# Patient Record
Sex: FEMALE | Race: BLACK OR AFRICAN AMERICAN | NOT HISPANIC OR LATINO | Employment: FULL TIME | URBAN - METROPOLITAN AREA
[De-identification: names, ages, dates, MRNs, and addresses within clinical notes are randomized per-mention and may not be internally consistent; named-entity substitution may affect disease eponyms.]

---

## 2021-06-10 DIAGNOSIS — M25.569 KNEE PAIN: Primary | ICD-10-CM

## 2021-06-10 DIAGNOSIS — M25.579 ANKLE PAIN: Primary | ICD-10-CM

## 2021-06-24 ENCOUNTER — HOSPITAL ENCOUNTER (OUTPATIENT)
Dept: RADIOLOGY | Facility: HOSPITAL | Age: 51
Discharge: HOME | End: 2021-06-24
Attending: FAMILY MEDICINE
Payer: COMMERCIAL

## 2021-06-24 DIAGNOSIS — M25.569 KNEE PAIN: ICD-10-CM

## 2021-06-24 DIAGNOSIS — M25.579 ANKLE PAIN: ICD-10-CM

## 2021-06-24 PROCEDURE — 73610 XR ANKLE COMPLETE 3 VIEW LEFT: ICD-10-PCS | Mod: 26,LT,, | Performed by: RADIOLOGY

## 2021-06-24 PROCEDURE — 73610 X-RAY EXAM OF ANKLE: CPT | Mod: TC,LT

## 2021-06-24 PROCEDURE — 73562 X-RAY EXAM OF KNEE 3: CPT | Mod: TC,LT

## 2021-06-24 PROCEDURE — 73562 XR KNEE 3 VIEW LEFT: ICD-10-PCS | Mod: 26,LT,, | Performed by: RADIOLOGY

## 2021-06-24 PROCEDURE — 73610 X-RAY EXAM OF ANKLE: CPT | Mod: 26,LT,, | Performed by: RADIOLOGY

## 2021-06-24 PROCEDURE — 73562 X-RAY EXAM OF KNEE 3: CPT | Mod: 26,LT,, | Performed by: RADIOLOGY

## 2021-07-27 ENCOUNTER — OFFICE VISIT (OUTPATIENT)
Dept: ORTHOPEDICS | Facility: CLINIC | Age: 51
End: 2021-07-27
Payer: COMMERCIAL

## 2021-07-27 VITALS — WEIGHT: 244 LBS | HEIGHT: 68 IN | BODY MASS INDEX: 36.98 KG/M2

## 2021-07-27 DIAGNOSIS — M17.12 PRIMARY OSTEOARTHRITIS OF LEFT KNEE: Primary | ICD-10-CM

## 2021-07-27 PROCEDURE — 1160F RVW MEDS BY RX/DR IN RCRD: CPT | Mod: CPTII,S$GLB,, | Performed by: ORTHOPAEDIC SURGERY

## 2021-07-27 PROCEDURE — 20610 LARGE JOINT ASPIRATION/INJECTION: L KNEE: ICD-10-PCS | Mod: LT,S$GLB,, | Performed by: ORTHOPAEDIC SURGERY

## 2021-07-27 PROCEDURE — 99999 PR PBB SHADOW E&M-EST. PATIENT-LVL III: ICD-10-PCS | Mod: PBBFAC,,, | Performed by: ORTHOPAEDIC SURGERY

## 2021-07-27 PROCEDURE — 99999 PR PBB SHADOW E&M-EST. PATIENT-LVL III: CPT | Mod: PBBFAC,,, | Performed by: ORTHOPAEDIC SURGERY

## 2021-07-27 PROCEDURE — 3008F BODY MASS INDEX DOCD: CPT | Mod: CPTII,S$GLB,, | Performed by: ORTHOPAEDIC SURGERY

## 2021-07-27 PROCEDURE — 1159F PR MEDICATION LIST DOCUMENTED IN MEDICAL RECORD: ICD-10-PCS | Mod: CPTII,S$GLB,, | Performed by: ORTHOPAEDIC SURGERY

## 2021-07-27 PROCEDURE — 1125F AMNT PAIN NOTED PAIN PRSNT: CPT | Mod: CPTII,S$GLB,, | Performed by: ORTHOPAEDIC SURGERY

## 2021-07-27 PROCEDURE — 99203 PR OFFICE/OUTPT VISIT, NEW, LEVL III, 30-44 MIN: ICD-10-PCS | Mod: 25,S$GLB,, | Performed by: ORTHOPAEDIC SURGERY

## 2021-07-27 PROCEDURE — 1125F PR PAIN SEVERITY QUANTIFIED, PAIN PRESENT: ICD-10-PCS | Mod: CPTII,S$GLB,, | Performed by: ORTHOPAEDIC SURGERY

## 2021-07-27 PROCEDURE — 1159F MED LIST DOCD IN RCRD: CPT | Mod: CPTII,S$GLB,, | Performed by: ORTHOPAEDIC SURGERY

## 2021-07-27 PROCEDURE — 20610 DRAIN/INJ JOINT/BURSA W/O US: CPT | Mod: LT,S$GLB,, | Performed by: ORTHOPAEDIC SURGERY

## 2021-07-27 PROCEDURE — 99203 OFFICE O/P NEW LOW 30 MIN: CPT | Mod: 25,S$GLB,, | Performed by: ORTHOPAEDIC SURGERY

## 2021-07-27 PROCEDURE — 1160F PR REVIEW ALL MEDS BY PRESCRIBER/CLIN PHARMACIST DOCUMENTED: ICD-10-PCS | Mod: CPTII,S$GLB,, | Performed by: ORTHOPAEDIC SURGERY

## 2021-07-27 PROCEDURE — 3008F PR BODY MASS INDEX (BMI) DOCUMENTED: ICD-10-PCS | Mod: CPTII,S$GLB,, | Performed by: ORTHOPAEDIC SURGERY

## 2021-07-27 RX ORDER — TRIAMCINOLONE ACETONIDE 40 MG/ML
40 INJECTION, SUSPENSION INTRA-ARTICULAR; INTRAMUSCULAR
Status: DISCONTINUED | OUTPATIENT
Start: 2021-07-27 | End: 2021-07-27 | Stop reason: HOSPADM

## 2021-07-27 RX ORDER — NAPROXEN 500 MG/1
TABLET ORAL
COMMUNITY
Start: 2021-06-10 | End: 2021-10-26

## 2021-07-27 RX ORDER — LOSARTAN POTASSIUM 50 MG/1
50 TABLET ORAL DAILY
COMMUNITY
Start: 2021-06-10 | End: 2022-04-18 | Stop reason: DRUGHIGH

## 2021-07-27 RX ORDER — TRAZODONE HYDROCHLORIDE 100 MG/1
100 TABLET ORAL NIGHTLY PRN
COMMUNITY
Start: 2021-06-10 | End: 2021-10-26

## 2021-07-27 RX ORDER — PANTOPRAZOLE SODIUM 40 MG/1
40 TABLET, DELAYED RELEASE ORAL DAILY
COMMUNITY
Start: 2021-06-21

## 2021-07-27 RX ORDER — ATORVASTATIN CALCIUM 10 MG/1
10 TABLET, FILM COATED ORAL DAILY
COMMUNITY
Start: 2021-06-10 | End: 2021-10-26

## 2021-07-27 RX ADMIN — TRIAMCINOLONE ACETONIDE 40 MG: 40 INJECTION, SUSPENSION INTRA-ARTICULAR; INTRAMUSCULAR at 10:07

## 2021-10-26 ENCOUNTER — OFFICE VISIT (OUTPATIENT)
Dept: ORTHOPEDICS | Facility: CLINIC | Age: 51
End: 2021-10-26
Payer: COMMERCIAL

## 2021-10-26 VITALS — HEIGHT: 68 IN | WEIGHT: 244.06 LBS | BODY MASS INDEX: 36.99 KG/M2

## 2021-10-26 DIAGNOSIS — M17.12 PRIMARY OSTEOARTHRITIS OF LEFT KNEE: Primary | ICD-10-CM

## 2021-10-26 DIAGNOSIS — R20.0 NUMBNESS OF LEFT FOOT: ICD-10-CM

## 2021-10-26 PROCEDURE — 1160F PR REVIEW ALL MEDS BY PRESCRIBER/CLIN PHARMACIST DOCUMENTED: ICD-10-PCS | Mod: CPTII,S$GLB,, | Performed by: ORTHOPAEDIC SURGERY

## 2021-10-26 PROCEDURE — 99213 PR OFFICE/OUTPT VISIT, EST, LEVL III, 20-29 MIN: ICD-10-PCS | Mod: S$GLB,,, | Performed by: ORTHOPAEDIC SURGERY

## 2021-10-26 PROCEDURE — 99999 PR PBB SHADOW E&M-EST. PATIENT-LVL III: CPT | Mod: PBBFAC,,, | Performed by: ORTHOPAEDIC SURGERY

## 2021-10-26 PROCEDURE — 4010F ACE/ARB THERAPY RXD/TAKEN: CPT | Mod: CPTII,S$GLB,, | Performed by: ORTHOPAEDIC SURGERY

## 2021-10-26 PROCEDURE — 3008F PR BODY MASS INDEX (BMI) DOCUMENTED: ICD-10-PCS | Mod: CPTII,S$GLB,, | Performed by: ORTHOPAEDIC SURGERY

## 2021-10-26 PROCEDURE — 99999 PR PBB SHADOW E&M-EST. PATIENT-LVL III: ICD-10-PCS | Mod: PBBFAC,,, | Performed by: ORTHOPAEDIC SURGERY

## 2021-10-26 PROCEDURE — 1159F PR MEDICATION LIST DOCUMENTED IN MEDICAL RECORD: ICD-10-PCS | Mod: CPTII,S$GLB,, | Performed by: ORTHOPAEDIC SURGERY

## 2021-10-26 PROCEDURE — 4010F PR ACE/ARB THEARPY RXD/TAKEN: ICD-10-PCS | Mod: CPTII,S$GLB,, | Performed by: ORTHOPAEDIC SURGERY

## 2021-10-26 PROCEDURE — 1159F MED LIST DOCD IN RCRD: CPT | Mod: CPTII,S$GLB,, | Performed by: ORTHOPAEDIC SURGERY

## 2021-10-26 PROCEDURE — 99213 OFFICE O/P EST LOW 20 MIN: CPT | Mod: S$GLB,,, | Performed by: ORTHOPAEDIC SURGERY

## 2021-10-26 PROCEDURE — 3008F BODY MASS INDEX DOCD: CPT | Mod: CPTII,S$GLB,, | Performed by: ORTHOPAEDIC SURGERY

## 2021-10-26 PROCEDURE — 1160F RVW MEDS BY RX/DR IN RCRD: CPT | Mod: CPTII,S$GLB,, | Performed by: ORTHOPAEDIC SURGERY

## 2021-10-26 RX ORDER — LISINOPRIL 20 MG/1
20 TABLET ORAL DAILY
COMMUNITY
End: 2022-07-06 | Stop reason: ALTCHOICE

## 2021-10-26 RX ORDER — MELOXICAM 15 MG/1
15 TABLET ORAL DAILY
Qty: 30 TABLET | Refills: 2 | Status: SHIPPED | OUTPATIENT
Start: 2021-10-26 | End: 2022-02-01 | Stop reason: SDUPTHER

## 2021-10-26 RX ORDER — HYDROCHLOROTHIAZIDE 12.5 MG/1
12.5 CAPSULE ORAL DAILY
COMMUNITY

## 2022-02-01 ENCOUNTER — OFFICE VISIT (OUTPATIENT)
Dept: ORTHOPEDICS | Facility: CLINIC | Age: 52
End: 2022-02-01
Payer: MEDICAID

## 2022-02-01 VITALS — WEIGHT: 244.06 LBS | BODY MASS INDEX: 36.99 KG/M2 | HEIGHT: 68 IN

## 2022-02-01 DIAGNOSIS — M17.12 PRIMARY OSTEOARTHRITIS OF LEFT KNEE: Primary | ICD-10-CM

## 2022-02-01 PROCEDURE — 99213 OFFICE O/P EST LOW 20 MIN: CPT | Mod: PBBFAC,PN | Performed by: ORTHOPAEDIC SURGERY

## 2022-02-01 PROCEDURE — 3008F PR BODY MASS INDEX (BMI) DOCUMENTED: ICD-10-PCS | Mod: CPTII,S$GLB,, | Performed by: ORTHOPAEDIC SURGERY

## 2022-02-01 PROCEDURE — 1159F MED LIST DOCD IN RCRD: CPT | Mod: CPTII,S$GLB,, | Performed by: ORTHOPAEDIC SURGERY

## 2022-02-01 PROCEDURE — 1160F RVW MEDS BY RX/DR IN RCRD: CPT | Mod: CPTII,S$GLB,, | Performed by: ORTHOPAEDIC SURGERY

## 2022-02-01 PROCEDURE — 3008F BODY MASS INDEX DOCD: CPT | Mod: CPTII,S$GLB,, | Performed by: ORTHOPAEDIC SURGERY

## 2022-02-01 PROCEDURE — 1159F PR MEDICATION LIST DOCUMENTED IN MEDICAL RECORD: ICD-10-PCS | Mod: CPTII,S$GLB,, | Performed by: ORTHOPAEDIC SURGERY

## 2022-02-01 PROCEDURE — 99213 OFFICE O/P EST LOW 20 MIN: CPT | Mod: S$GLB,,, | Performed by: ORTHOPAEDIC SURGERY

## 2022-02-01 PROCEDURE — 99999 PR PBB SHADOW E&M-EST. PATIENT-LVL III: CPT | Mod: PBBFAC,,, | Performed by: ORTHOPAEDIC SURGERY

## 2022-02-01 PROCEDURE — 99999 PR PBB SHADOW E&M-EST. PATIENT-LVL III: ICD-10-PCS | Mod: PBBFAC,,, | Performed by: ORTHOPAEDIC SURGERY

## 2022-02-01 PROCEDURE — 99213 PR OFFICE/OUTPT VISIT, EST, LEVL III, 20-29 MIN: ICD-10-PCS | Mod: S$GLB,,, | Performed by: ORTHOPAEDIC SURGERY

## 2022-02-01 PROCEDURE — 1160F PR REVIEW ALL MEDS BY PRESCRIBER/CLIN PHARMACIST DOCUMENTED: ICD-10-PCS | Mod: CPTII,S$GLB,, | Performed by: ORTHOPAEDIC SURGERY

## 2022-02-01 RX ORDER — MELOXICAM 15 MG/1
15 TABLET ORAL DAILY
Qty: 30 TABLET | Refills: 5 | Status: SHIPPED | OUTPATIENT
Start: 2022-02-01

## 2022-02-01 NOTE — PROGRESS NOTES
Subjective:      Patient ID: Jerald Morales is a 51 y.o. female.    Chief Complaint: Knee Pain (Left)    HPI    Follow-up for osteoarthritis.  The patient reports meloxicam was very helpful.  Her to prescription ran out and she notes increasing medial pain radiating down the medial leg.  She denies swelling or mechanical symptoms.        Review of Systems   Constitutional: Negative for fever and weight loss.   HENT: Negative for congestion.    Eyes: Negative for visual disturbance.   Cardiovascular: Negative for chest pain.   Respiratory: Negative for shortness of breath.    Hematologic/Lymphatic: Negative for bleeding problem. Does not bruise/bleed easily.   Skin: Negative for poor wound healing.   Musculoskeletal: Positive for joint pain.   Gastrointestinal: Negative for abdominal pain.   Genitourinary: Negative for dysuria.   Neurological: Negative for seizures.   Psychiatric/Behavioral: Negative for altered mental status.   Allergic/Immunologic: Negative for persistent infections.         Objective:      Ortho/SPM Exam      Left knee     The patient is not in acute distress.   Body habitus is normal.   Sclera appear normal  No respiratory distress  The patient walks without a limp.  Resisted SLR negative.   The skin over the knee is intact.  Knee effusion trace  Tendernes is located absent.  Range of motion- Flexion full, Extension full.   Ligament exam:              MCL 1+              Lachman intact              Post sag intact              LCL intact  Patellar apprehension negative.  Popliteal cyst negative  Patellar crepitation absent.  Flexion/pinch negative.  Pulses DP present, PT present.  Motor normal 5/5 strength in all tested muscle groups.   Sensory normal.              Assessment:       No diagnosis found.     The condition is structurally mild.  Considering the good response to oral medication I recommend refilling this for now.  If the condition progresses surgical intervention may be  considered.        Plan:       There are no diagnoses linked to this encounter.      I explained my diagnostic impression and the reasoning behind it in detail, using layman's terms.  Models and/or pictures were used to help in the explanation.    Refill meloxicam    I explained to the patient that I am providing a prescription for anti-inflammatory medication.  I warned the patient that it should not be taken with other anti-inflammatory medications including over-the-counter products containing ibuprofen and naproxen.  I advised them to consult their primary physician or pharmacist if there is any question about this in the future.  I discussed the possible side effects including cardiovascular issues, renal issues and gastrointestinal problems.  I advised the patient to discontinue the medication should they develop persistent symptoms of dyspepsia.    I also explained that should they elect to continue this medication long-term, that is beyond the prescription that I provide at this time, they should contact their primary physician for refills and appropriate monitoring.    I explained the potential role of surgery in the treatment of this condition to the patient.  They understand that if nonsurgical measures do not adequately control symptoms, surgery will be considered in the future.    X-ray next visit

## 2022-04-18 ENCOUNTER — OFFICE VISIT (OUTPATIENT)
Dept: GASTROENTEROLOGY | Facility: CLINIC | Age: 52
End: 2022-04-18
Payer: COMMERCIAL

## 2022-04-18 VITALS
SYSTOLIC BLOOD PRESSURE: 132 MMHG | HEART RATE: 65 BPM | WEIGHT: 270.38 LBS | DIASTOLIC BLOOD PRESSURE: 81 MMHG | HEIGHT: 68 IN | BODY MASS INDEX: 40.98 KG/M2

## 2022-04-18 DIAGNOSIS — E66.01 CLASS 3 SEVERE OBESITY DUE TO EXCESS CALORIES WITHOUT SERIOUS COMORBIDITY WITH BODY MASS INDEX (BMI) OF 40.0 TO 44.9 IN ADULT: ICD-10-CM

## 2022-04-18 DIAGNOSIS — K59.09 CHRONIC CONSTIPATION: Primary | ICD-10-CM

## 2022-04-18 DIAGNOSIS — K21.9 GASTROESOPHAGEAL REFLUX DISEASE, UNSPECIFIED WHETHER ESOPHAGITIS PRESENT: ICD-10-CM

## 2022-04-18 PROBLEM — E66.813 CLASS 3 SEVERE OBESITY DUE TO EXCESS CALORIES WITHOUT SERIOUS COMORBIDITY WITH BODY MASS INDEX (BMI) OF 40.0 TO 44.9 IN ADULT: Status: ACTIVE | Noted: 2022-04-18

## 2022-04-18 PROBLEM — I10 ESSENTIAL HYPERTENSION: Status: ACTIVE | Noted: 2022-04-18

## 2022-04-18 PROCEDURE — 3008F BODY MASS INDEX DOCD: CPT | Mod: CPTII,S$GLB,, | Performed by: NURSE PRACTITIONER

## 2022-04-18 PROCEDURE — 99203 OFFICE O/P NEW LOW 30 MIN: CPT | Mod: S$GLB,,, | Performed by: NURSE PRACTITIONER

## 2022-04-18 PROCEDURE — 4010F PR ACE/ARB THEARPY RXD/TAKEN: ICD-10-PCS | Mod: CPTII,S$GLB,, | Performed by: NURSE PRACTITIONER

## 2022-04-18 PROCEDURE — 4010F ACE/ARB THERAPY RXD/TAKEN: CPT | Mod: CPTII,S$GLB,, | Performed by: NURSE PRACTITIONER

## 2022-04-18 PROCEDURE — 99214 OFFICE O/P EST MOD 30 MIN: CPT | Mod: PBBFAC,PO | Performed by: NURSE PRACTITIONER

## 2022-04-18 PROCEDURE — 3079F PR MOST RECENT DIASTOLIC BLOOD PRESSURE 80-89 MM HG: ICD-10-PCS | Mod: CPTII,S$GLB,, | Performed by: NURSE PRACTITIONER

## 2022-04-18 PROCEDURE — 1159F PR MEDICATION LIST DOCUMENTED IN MEDICAL RECORD: ICD-10-PCS | Mod: CPTII,S$GLB,, | Performed by: NURSE PRACTITIONER

## 2022-04-18 PROCEDURE — 99203 PR OFFICE/OUTPT VISIT, NEW, LEVL III, 30-44 MIN: ICD-10-PCS | Mod: S$GLB,,, | Performed by: NURSE PRACTITIONER

## 2022-04-18 PROCEDURE — 3008F PR BODY MASS INDEX (BMI) DOCUMENTED: ICD-10-PCS | Mod: CPTII,S$GLB,, | Performed by: NURSE PRACTITIONER

## 2022-04-18 PROCEDURE — 1160F PR REVIEW ALL MEDS BY PRESCRIBER/CLIN PHARMACIST DOCUMENTED: ICD-10-PCS | Mod: CPTII,S$GLB,, | Performed by: NURSE PRACTITIONER

## 2022-04-18 PROCEDURE — 99999 PR PBB SHADOW E&M-EST. PATIENT-LVL IV: CPT | Mod: PBBFAC,,, | Performed by: NURSE PRACTITIONER

## 2022-04-18 PROCEDURE — 3075F SYST BP GE 130 - 139MM HG: CPT | Mod: CPTII,S$GLB,, | Performed by: NURSE PRACTITIONER

## 2022-04-18 PROCEDURE — 99999 PR PBB SHADOW E&M-EST. PATIENT-LVL IV: ICD-10-PCS | Mod: PBBFAC,,, | Performed by: NURSE PRACTITIONER

## 2022-04-18 PROCEDURE — 3075F PR MOST RECENT SYSTOLIC BLOOD PRESS GE 130-139MM HG: ICD-10-PCS | Mod: CPTII,S$GLB,, | Performed by: NURSE PRACTITIONER

## 2022-04-18 PROCEDURE — 1160F RVW MEDS BY RX/DR IN RCRD: CPT | Mod: CPTII,S$GLB,, | Performed by: NURSE PRACTITIONER

## 2022-04-18 PROCEDURE — 1159F MED LIST DOCD IN RCRD: CPT | Mod: CPTII,S$GLB,, | Performed by: NURSE PRACTITIONER

## 2022-04-18 PROCEDURE — 3079F DIAST BP 80-89 MM HG: CPT | Mod: CPTII,S$GLB,, | Performed by: NURSE PRACTITIONER

## 2022-04-18 RX ORDER — ATORVASTATIN CALCIUM 10 MG/1
10 TABLET, FILM COATED ORAL DAILY
COMMUNITY
Start: 2022-03-18

## 2022-04-18 RX ORDER — LOSARTAN POTASSIUM 100 MG/1
100 TABLET ORAL DAILY
COMMUNITY
Start: 2022-03-18

## 2022-04-18 RX ORDER — TRAZODONE HYDROCHLORIDE 100 MG/1
100 TABLET ORAL NIGHTLY
COMMUNITY
Start: 2022-03-18

## 2022-04-18 RX ORDER — LINACLOTIDE 145 UG/1
145 CAPSULE, GELATIN COATED ORAL EVERY MORNING
COMMUNITY
Start: 2021-12-06 | End: 2022-04-18

## 2022-04-18 NOTE — PATIENT INSTRUCTIONS
Purchase 3 bottles of magnesium citrate (can be purchased from any pharmacy or [facility]). Drink 1 bottle of magnesium citrate and repeat 2nd bottle in 2-3 hours to clean out your colon and repeat again for 3 bottle. Then start Linzess 72 mcg daily.    Follow up in 3-4 weeks.

## 2022-04-18 NOTE — PROGRESS NOTES
Subjective:       Patient ID: Jerald Morales is a 51 y.o. female.    Chief Complaint: Constipation, Gastroesophageal Reflux, and Nausea (After eating )    52 y/o female with hypertension referred by PCP for GERD and constipation. Patient reports chronic constipation for over 1 year. She is taking Linzess 145 mcg as needed. States Linzess causes severe diarrhea. Advised patient medication is must be taken daily for effectiveness. Also tried Miralax which caused significant bloating and was not effective. Takes pantoprazole daily with moderate symptom control. Avoids known triggers including acidic and spicy foods. Reports early satiety and nausea after meals. Does not eat breakfast or lunch due to bloating and usually eats after work. Decreased food intake without weight loss. No dysphagia, hematemesis, or hematochezia. No previous GI work up.       Past Medical History:   Diagnosis Date    Hypertension        Past Surgical History:   Procedure Laterality Date    ECTOPIC PREGNANCY SURGERY         Family History   Problem Relation Age of Onset    Hypertension Father     Diabetes Father     Hypertension Mother     Diabetes Mother     Hypertension Brother     Diabetes Brother        Social History     Socioeconomic History    Marital status:    Tobacco Use    Smoking status: Never Smoker    Smokeless tobacco: Never Used       Review of Systems   Constitutional: Negative for appetite change and unexpected weight change.   HENT: Negative for trouble swallowing.    Respiratory: Negative for shortness of breath.    Cardiovascular: Negative for chest pain.   Gastrointestinal: Positive for abdominal pain, constipation and nausea. Negative for blood in stool.   Neurological: Negative for dizziness and weakness.   Hematological: Negative for adenopathy. Does not bruise/bleed easily.   Psychiatric/Behavioral: Negative for dysphoric mood.         Objective:     Vitals:    04/18/22 1103   BP: 132/81  "  Pulse: 65   Weight: 122.7 kg (270 lb 6.4 oz)   Height: 5' 8" (1.727 m)          Physical Exam  Constitutional:       General: She is not in acute distress.     Appearance: Normal appearance. She is not ill-appearing.   HENT:      Head: Normocephalic.   Eyes:      Conjunctiva/sclera: Conjunctivae normal.      Pupils: Pupils are equal, round, and reactive to light.   Pulmonary:      Effort: Pulmonary effort is normal. No respiratory distress.   Musculoskeletal:         General: Normal range of motion.      Cervical back: Normal range of motion.   Skin:     General: Skin is warm and dry.   Neurological:      Mental Status: She is alert and oriented to person, place, and time.   Psychiatric:         Mood and Affect: Mood normal.         Behavior: Behavior normal.               Assessment:         ICD-10-CM ICD-9-CM   1. Chronic constipation  K59.09 564.00   2. Gastroesophageal reflux disease, unspecified whether esophagitis present  K21.9 530.81   3. Class 3 severe obesity due to excess calories without serious comorbidity with body mass index (BMI) of 40.0 to 44.9 in adult  E66.01 278.01    Z68.41 V85.41       Plan:       Chronic constipation  -     linaCLOtide (LINZESS) 72 mcg Cap capsule; Take 1 capsule (72 mcg total) by mouth once daily.  Dispense: 30 capsule; Refill: 2    Gastroesophageal reflux disease, unspecified whether esophagitis present    Class 3 severe obesity due to excess calories without serious comorbidity with body mass index (BMI) of 40.0 to 44.9 in adult    - Mag citrate colon cleanse then restart Linzess daily. Continue daily pantoprazole. Follow up in 3-4 weeks for medication management. If constipation improved, will schedule initial screening colonoscopy.     Follow up if symptoms worsen or fail to improve.     Patient's Medications   New Prescriptions    LINACLOTIDE (LINZESS) 72 MCG CAP CAPSULE    Take 1 capsule (72 mcg total) by mouth once daily.   Previous Medications    ATORVASTATIN " (LIPITOR) 10 MG TABLET    Take 10 mg by mouth once daily.    HYDROCHLOROTHIAZIDE (MICROZIDE) 12.5 MG CAPSULE    Take 12.5 mg by mouth once daily.    LISINOPRIL (PRINIVIL,ZESTRIL) 20 MG TABLET    Take 20 mg by mouth once daily.    LOSARTAN (COZAAR) 100 MG TABLET    Take 100 mg by mouth once daily.    MELOXICAM (MOBIC) 15 MG TABLET    Take 1 tablet (15 mg total) by mouth once daily.    PANTOPRAZOLE (PROTONIX) 40 MG TABLET    Take 40 mg by mouth once daily.    TRAZODONE (DESYREL) 100 MG TABLET    Take 100 mg by mouth nightly.   Modified Medications    No medications on file   Discontinued Medications    LINZESS 145 MCG CAP CAPSULE    Take 145 mcg by mouth every morning.    LOSARTAN (COZAAR) 50 MG TABLET    Take 50 mg by mouth once daily.

## 2022-05-09 ENCOUNTER — OFFICE VISIT (OUTPATIENT)
Dept: GASTROENTEROLOGY | Facility: CLINIC | Age: 52
End: 2022-05-09
Payer: COMMERCIAL

## 2022-05-09 ENCOUNTER — PATIENT MESSAGE (OUTPATIENT)
Dept: GASTROENTEROLOGY | Facility: CLINIC | Age: 52
End: 2022-05-09

## 2022-05-09 VITALS
WEIGHT: 268.63 LBS | SYSTOLIC BLOOD PRESSURE: 126 MMHG | HEIGHT: 68 IN | HEART RATE: 64 BPM | DIASTOLIC BLOOD PRESSURE: 80 MMHG | BODY MASS INDEX: 40.71 KG/M2

## 2022-05-09 DIAGNOSIS — K21.9 GASTROESOPHAGEAL REFLUX DISEASE, UNSPECIFIED WHETHER ESOPHAGITIS PRESENT: ICD-10-CM

## 2022-05-09 DIAGNOSIS — E66.01 CLASS 3 SEVERE OBESITY DUE TO EXCESS CALORIES WITHOUT SERIOUS COMORBIDITY WITH BODY MASS INDEX (BMI) OF 40.0 TO 44.9 IN ADULT: ICD-10-CM

## 2022-05-09 DIAGNOSIS — K59.09 CHRONIC CONSTIPATION: Primary | ICD-10-CM

## 2022-05-09 PROCEDURE — 4010F ACE/ARB THERAPY RXD/TAKEN: CPT | Mod: CPTII,S$GLB,, | Performed by: NURSE PRACTITIONER

## 2022-05-09 PROCEDURE — 99214 OFFICE O/P EST MOD 30 MIN: CPT | Mod: PBBFAC,PO | Performed by: NURSE PRACTITIONER

## 2022-05-09 PROCEDURE — 1160F PR REVIEW ALL MEDS BY PRESCRIBER/CLIN PHARMACIST DOCUMENTED: ICD-10-PCS | Mod: CPTII,S$GLB,, | Performed by: NURSE PRACTITIONER

## 2022-05-09 PROCEDURE — 1159F MED LIST DOCD IN RCRD: CPT | Mod: CPTII,S$GLB,, | Performed by: NURSE PRACTITIONER

## 2022-05-09 PROCEDURE — 3079F DIAST BP 80-89 MM HG: CPT | Mod: CPTII,S$GLB,, | Performed by: NURSE PRACTITIONER

## 2022-05-09 PROCEDURE — 1159F PR MEDICATION LIST DOCUMENTED IN MEDICAL RECORD: ICD-10-PCS | Mod: CPTII,S$GLB,, | Performed by: NURSE PRACTITIONER

## 2022-05-09 PROCEDURE — 99999 PR PBB SHADOW E&M-EST. PATIENT-LVL IV: ICD-10-PCS | Mod: PBBFAC,,, | Performed by: NURSE PRACTITIONER

## 2022-05-09 PROCEDURE — 3079F PR MOST RECENT DIASTOLIC BLOOD PRESSURE 80-89 MM HG: ICD-10-PCS | Mod: CPTII,S$GLB,, | Performed by: NURSE PRACTITIONER

## 2022-05-09 PROCEDURE — 3074F PR MOST RECENT SYSTOLIC BLOOD PRESSURE < 130 MM HG: ICD-10-PCS | Mod: CPTII,S$GLB,, | Performed by: NURSE PRACTITIONER

## 2022-05-09 PROCEDURE — 3008F PR BODY MASS INDEX (BMI) DOCUMENTED: ICD-10-PCS | Mod: CPTII,S$GLB,, | Performed by: NURSE PRACTITIONER

## 2022-05-09 PROCEDURE — 3008F BODY MASS INDEX DOCD: CPT | Mod: CPTII,S$GLB,, | Performed by: NURSE PRACTITIONER

## 2022-05-09 PROCEDURE — 1160F RVW MEDS BY RX/DR IN RCRD: CPT | Mod: CPTII,S$GLB,, | Performed by: NURSE PRACTITIONER

## 2022-05-09 PROCEDURE — 99214 OFFICE O/P EST MOD 30 MIN: CPT | Mod: S$GLB,,, | Performed by: NURSE PRACTITIONER

## 2022-05-09 PROCEDURE — 99214 PR OFFICE/OUTPT VISIT, EST, LEVL IV, 30-39 MIN: ICD-10-PCS | Mod: S$GLB,,, | Performed by: NURSE PRACTITIONER

## 2022-05-09 PROCEDURE — 4010F PR ACE/ARB THEARPY RXD/TAKEN: ICD-10-PCS | Mod: CPTII,S$GLB,, | Performed by: NURSE PRACTITIONER

## 2022-05-09 PROCEDURE — 3074F SYST BP LT 130 MM HG: CPT | Mod: CPTII,S$GLB,, | Performed by: NURSE PRACTITIONER

## 2022-05-09 PROCEDURE — 99999 PR PBB SHADOW E&M-EST. PATIENT-LVL IV: CPT | Mod: PBBFAC,,, | Performed by: NURSE PRACTITIONER

## 2022-05-09 RX ORDER — LUBIPROSTONE 24 UG/1
24 CAPSULE ORAL 2 TIMES DAILY WITH MEALS
Qty: 60 CAPSULE | Refills: 2 | Status: SHIPPED | OUTPATIENT
Start: 2022-05-09 | End: 2022-07-25 | Stop reason: SDUPTHER

## 2022-05-09 RX ORDER — FAMOTIDINE 40 MG/1
40 TABLET, FILM COATED ORAL NIGHTLY PRN
Qty: 30 TABLET | Refills: 2 | Status: SHIPPED | OUTPATIENT
Start: 2022-05-09 | End: 2023-05-09

## 2022-05-09 NOTE — PROGRESS NOTES
Subjective:       Patient ID: Jerald Morales is a 51 y.o. female.    Chief Complaint: Follow-up    50 y/o female with hypertension, GERD, and chronic constipation presents to clinic for follow up. States she is no longer taking Linzess 2/2 fecal urgency and incontinence. Reports at least 2 episodes of BMs while sleep last week. Feels bloated and constantly full. She has tried Miralax which caused significant bloating and was not effective. Takes pantoprazole daily with moderate symptom control. Avoids known triggers including acidic and spicy foods. Reports early satiety and nausea after meals. Does not eat breakfast or lunch due to bloating and usually eats after work. Decreased food intake without weight loss. No dysphagia, hematemesis, or hematochezia. No previous GI work up.        Past Medical History:   Diagnosis Date    Hypertension        Past Surgical History:   Procedure Laterality Date    ECTOPIC PREGNANCY SURGERY         Family History   Problem Relation Age of Onset    Hypertension Father     Diabetes Father     Hypertension Mother     Diabetes Mother     Hypertension Brother     Diabetes Brother        Social History     Socioeconomic History    Marital status:    Tobacco Use    Smoking status: Never Smoker    Smokeless tobacco: Never Used       Review of Systems   Constitutional: Negative for appetite change and unexpected weight change.   HENT: Negative for trouble swallowing.    Respiratory: Negative for shortness of breath.    Cardiovascular: Negative for chest pain.   Gastrointestinal: Positive for abdominal pain, constipation and nausea. Negative for blood in stool.   Neurological: Negative for dizziness and weakness.   Hematological: Negative for adenopathy. Does not bruise/bleed easily.   Psychiatric/Behavioral: Negative for dysphoric mood.         Objective:     Vitals:    05/09/22 1357   BP: 126/80   BP Location: Right arm   Patient Position: Sitting   BP Method:  "Large (Manual)   Pulse: 64   Weight: 121.8 kg (268 lb 9.6 oz)   Height: 5' 8" (1.727 m)          Physical Exam  Constitutional:       General: She is not in acute distress.     Appearance: Normal appearance. She is not ill-appearing.   HENT:      Head: Normocephalic.   Eyes:      Conjunctiva/sclera: Conjunctivae normal.      Pupils: Pupils are equal, round, and reactive to light.   Pulmonary:      Effort: Pulmonary effort is normal. No respiratory distress.   Musculoskeletal:         General: Normal range of motion.      Cervical back: Normal range of motion.   Skin:     General: Skin is warm and dry.   Neurological:      Mental Status: She is alert and oriented to person, place, and time.   Psychiatric:         Mood and Affect: Mood normal.         Behavior: Behavior normal.               Assessment:         ICD-10-CM ICD-9-CM   1. Chronic constipation  K59.09 564.00   2. Gastroesophageal reflux disease, unspecified whether esophagitis present  K21.9 530.81   3. Class 3 severe obesity due to excess calories without serious comorbidity with body mass index (BMI) of 40.0 to 44.9 in adult  E66.01 278.01    Z68.41 V85.41       Plan:       Chronic constipation  -     X-Ray Abdomen Flat And Erect; Future; Expected date: 05/09/2022  -     lubiprostone (AMITIZA) 24 MCG Cap; Take 1 capsule (24 mcg total) by mouth 2 (two) times daily with meals.  Dispense: 60 capsule; Refill: 2  -     Ambulatory referral/consult to Physical/Occupational Therapy; Future; Expected date: 05/16/2022    Gastroesophageal reflux disease, unspecified whether esophagitis present  -     famotidine (PEPCID) 40 MG tablet; Take 1 tablet (40 mg total) by mouth nightly as needed for Heartburn.  Dispense: 30 tablet; Refill: 2    Class 3 severe obesity due to excess calories without serious comorbidity with body mass index (BMI) of 40.0 to 44.9 in adult        -     Weight loss advised. Dietary and exercise counseling done.      Follow up if symptoms worsen " or fail to improve.     Patient's Medications   New Prescriptions    FAMOTIDINE (PEPCID) 40 MG TABLET    Take 1 tablet (40 mg total) by mouth nightly as needed for Heartburn.    LUBIPROSTONE (AMITIZA) 24 MCG CAP    Take 1 capsule (24 mcg total) by mouth 2 (two) times daily with meals.   Previous Medications    ATORVASTATIN (LIPITOR) 10 MG TABLET    Take 10 mg by mouth once daily.    HYDROCHLOROTHIAZIDE (MICROZIDE) 12.5 MG CAPSULE    Take 12.5 mg by mouth once daily.    LISINOPRIL (PRINIVIL,ZESTRIL) 20 MG TABLET    Take 20 mg by mouth once daily.    LOSARTAN (COZAAR) 100 MG TABLET    Take 100 mg by mouth once daily.    MELOXICAM (MOBIC) 15 MG TABLET    Take 1 tablet (15 mg total) by mouth once daily.    PANTOPRAZOLE (PROTONIX) 40 MG TABLET    Take 40 mg by mouth once daily.    TRAZODONE (DESYREL) 100 MG TABLET    Take 100 mg by mouth nightly.   Modified Medications    No medications on file   Discontinued Medications    LINACLOTIDE (LINZESS) 72 MCG CAP CAPSULE    Take 1 capsule (72 mcg total) by mouth once daily.

## 2022-05-09 NOTE — PATIENT INSTRUCTIONS
- Pantoprazole 40 mg every morning on empty stomach 30-45 minutes before food or other medication  - Famotidine 40 mg every evening before bedtime

## 2022-05-12 ENCOUNTER — TELEPHONE (OUTPATIENT)
Dept: PHARMACY | Facility: CLINIC | Age: 52
End: 2022-05-12
Payer: COMMERCIAL

## 2022-05-17 PROBLEM — R53.1 WEAKNESS: Status: ACTIVE | Noted: 2022-05-17

## 2022-05-17 PROBLEM — R52 PAIN: Status: ACTIVE | Noted: 2022-05-17

## 2022-06-01 ENCOUNTER — TELEPHONE (OUTPATIENT)
Dept: GASTROENTEROLOGY | Facility: CLINIC | Age: 52
End: 2022-06-01
Payer: COMMERCIAL

## 2022-06-01 ENCOUNTER — PATIENT MESSAGE (OUTPATIENT)
Dept: GASTROENTEROLOGY | Facility: CLINIC | Age: 52
End: 2022-06-01
Payer: COMMERCIAL

## 2022-06-01 DIAGNOSIS — Z12.11 SCREENING FOR MALIGNANT NEOPLASM OF COLON: Primary | ICD-10-CM

## 2022-06-01 RX ORDER — SODIUM, POTASSIUM,MAG SULFATES 17.5-3.13G
1 SOLUTION, RECONSTITUTED, ORAL ORAL DAILY
Qty: 1 KIT | Refills: 0 | Status: SHIPPED | OUTPATIENT
Start: 2022-06-01 | End: 2022-06-03

## 2022-06-01 NOTE — PROGRESS NOTES
Spoke with patient via telephone to schedule colonoscopy. Bowel prep instructions review and written instructions sent via [employer]. States constipation has improved with Amitiza twice daily.

## 2022-06-01 NOTE — TELEPHONE ENCOUNTER
----- Message from TAIWO Manzo sent at 6/1/2022 11:14 AM CDT -----  Please determine pt's COVID vaccine status. If not vaccinated, she will need a pre-op COVID test prior to colonoscopy.

## 2022-07-11 ENCOUNTER — TELEPHONE (OUTPATIENT)
Dept: GASTROENTEROLOGY | Facility: CLINIC | Age: 52
End: 2022-07-11
Payer: COMMERCIAL

## 2022-07-11 NOTE — TELEPHONE ENCOUNTER
----- Message from TAIWO Manzo sent at 7/8/2022  9:25 AM CDT -----  Schedule follow up in 1-2 weeks.

## 2022-07-25 ENCOUNTER — OFFICE VISIT (OUTPATIENT)
Dept: GASTROENTEROLOGY | Facility: CLINIC | Age: 52
End: 2022-07-25
Payer: COMMERCIAL

## 2022-07-25 VITALS
DIASTOLIC BLOOD PRESSURE: 70 MMHG | SYSTOLIC BLOOD PRESSURE: 124 MMHG | BODY MASS INDEX: 40.47 KG/M2 | HEART RATE: 69 BPM | WEIGHT: 267 LBS | HEIGHT: 68 IN

## 2022-07-25 DIAGNOSIS — E66.01 CLASS 3 SEVERE OBESITY DUE TO EXCESS CALORIES WITHOUT SERIOUS COMORBIDITY WITH BODY MASS INDEX (BMI) OF 40.0 TO 44.9 IN ADULT: ICD-10-CM

## 2022-07-25 DIAGNOSIS — K21.9 GASTROESOPHAGEAL REFLUX DISEASE, UNSPECIFIED WHETHER ESOPHAGITIS PRESENT: ICD-10-CM

## 2022-07-25 DIAGNOSIS — K59.09 CHRONIC CONSTIPATION: Primary | ICD-10-CM

## 2022-07-25 PROCEDURE — 1159F MED LIST DOCD IN RCRD: CPT | Mod: CPTII,S$GLB,, | Performed by: NURSE PRACTITIONER

## 2022-07-25 PROCEDURE — 4010F PR ACE/ARB THEARPY RXD/TAKEN: ICD-10-PCS | Mod: CPTII,S$GLB,, | Performed by: NURSE PRACTITIONER

## 2022-07-25 PROCEDURE — 99214 OFFICE O/P EST MOD 30 MIN: CPT | Mod: S$GLB,,, | Performed by: NURSE PRACTITIONER

## 2022-07-25 PROCEDURE — 99999 PR PBB SHADOW E&M-EST. PATIENT-LVL IV: CPT | Mod: PBBFAC,,, | Performed by: NURSE PRACTITIONER

## 2022-07-25 PROCEDURE — 1159F PR MEDICATION LIST DOCUMENTED IN MEDICAL RECORD: ICD-10-PCS | Mod: CPTII,S$GLB,, | Performed by: NURSE PRACTITIONER

## 2022-07-25 PROCEDURE — 99999 PR PBB SHADOW E&M-EST. PATIENT-LVL IV: ICD-10-PCS | Mod: PBBFAC,,, | Performed by: NURSE PRACTITIONER

## 2022-07-25 PROCEDURE — 3008F PR BODY MASS INDEX (BMI) DOCUMENTED: ICD-10-PCS | Mod: CPTII,S$GLB,, | Performed by: NURSE PRACTITIONER

## 2022-07-25 PROCEDURE — 1160F PR REVIEW ALL MEDS BY PRESCRIBER/CLIN PHARMACIST DOCUMENTED: ICD-10-PCS | Mod: CPTII,S$GLB,, | Performed by: NURSE PRACTITIONER

## 2022-07-25 PROCEDURE — 99214 PR OFFICE/OUTPT VISIT, EST, LEVL IV, 30-39 MIN: ICD-10-PCS | Mod: S$GLB,,, | Performed by: NURSE PRACTITIONER

## 2022-07-25 PROCEDURE — 3074F PR MOST RECENT SYSTOLIC BLOOD PRESSURE < 130 MM HG: ICD-10-PCS | Mod: CPTII,S$GLB,, | Performed by: NURSE PRACTITIONER

## 2022-07-25 PROCEDURE — 3078F DIAST BP <80 MM HG: CPT | Mod: CPTII,S$GLB,, | Performed by: NURSE PRACTITIONER

## 2022-07-25 PROCEDURE — 3008F BODY MASS INDEX DOCD: CPT | Mod: CPTII,S$GLB,, | Performed by: NURSE PRACTITIONER

## 2022-07-25 PROCEDURE — 4010F ACE/ARB THERAPY RXD/TAKEN: CPT | Mod: CPTII,S$GLB,, | Performed by: NURSE PRACTITIONER

## 2022-07-25 PROCEDURE — 3074F SYST BP LT 130 MM HG: CPT | Mod: CPTII,S$GLB,, | Performed by: NURSE PRACTITIONER

## 2022-07-25 PROCEDURE — 1160F RVW MEDS BY RX/DR IN RCRD: CPT | Mod: CPTII,S$GLB,, | Performed by: NURSE PRACTITIONER

## 2022-07-25 PROCEDURE — 3078F PR MOST RECENT DIASTOLIC BLOOD PRESSURE < 80 MM HG: ICD-10-PCS | Mod: CPTII,S$GLB,, | Performed by: NURSE PRACTITIONER

## 2022-07-25 RX ORDER — LUBIPROSTONE 24 UG/1
24 CAPSULE ORAL 2 TIMES DAILY WITH MEALS
Qty: 60 CAPSULE | Refills: 5 | Status: SHIPPED | OUTPATIENT
Start: 2022-07-25

## 2022-07-25 NOTE — PROGRESS NOTES
"Subjective:       Patient ID: Jerald Morales is a 51 y.o. female.    Chief Complaint: Follow-up    50 y/o female with hypertension, GERD, and chronic constipation presents to clinic for follow up. Symptoms controlled with current medication including pantoprazole 40 mg daily and Amitiza 24 mcg BID. Fecal urgency and incontinence improving with pelvic floor PT. Reports recurrence of heartburn if daily PPI is stopped. Denies abdominal pain, n/v, early satiety, melena or hematochezia.      Past Medical History:   Diagnosis Date    Hypertension        Past Surgical History:   Procedure Laterality Date    COLONOSCOPY N/A 7/8/2022    Procedure: COLONOSCOPY;  Surgeon: Stella Smith MD;  [facility]  Service: Endoscopy;  Laterality: N/A;    ECTOPIC PREGNANCY SURGERY         Family History   Problem Relation Age of Onset    Hypertension Father     Diabetes Father     Hypertension Mother     Diabetes Mother     Hypertension Brother     Diabetes Brother        Social History     Socioeconomic History    Marital status:    Tobacco Use    Smoking status: Never Smoker    Smokeless tobacco: Never Used   Substance and Sexual Activity    Alcohol use: Yes     Comment: social    Drug use: Not Currently       Review of Systems   Constitutional: Negative for appetite change and unexpected weight change.   HENT: Negative for trouble swallowing.    Respiratory: Negative for shortness of breath.    Cardiovascular: Negative for chest pain.   Gastrointestinal: Negative for abdominal pain, blood in stool, constipation and nausea.   Neurological: Negative for dizziness and weakness.   Hematological: Negative for adenopathy. Does not bruise/bleed easily.   Psychiatric/Behavioral: Negative for dysphoric mood.         Objective:     Vitals:    07/25/22 1414   BP: 124/70   BP Location: Right arm   Pulse: 69   Weight: 121.1 kg (267 lb)   Height: 5' 8" (1.727 m)          Physical Exam  Constitutional:       " General: She is not in acute distress.     Appearance: Normal appearance. She is not ill-appearing.   HENT:      Head: Normocephalic.   Eyes:      Conjunctiva/sclera: Conjunctivae normal.      Pupils: Pupils are equal, round, and reactive to light.   Pulmonary:      Effort: Pulmonary effort is normal. No respiratory distress.   Musculoskeletal:         General: Normal range of motion.      Cervical back: Normal range of motion.   Skin:     General: Skin is warm and dry.   Neurological:      Mental Status: She is alert and oriented to person, place, and time.   Psychiatric:         Mood and Affect: Mood normal.         Behavior: Behavior normal.               Assessment:         ICD-10-CM ICD-9-CM   1. Chronic constipation  K59.09 564.00   2. Gastroesophageal reflux disease, unspecified whether esophagitis present  K21.9 530.81   3. Class 3 severe obesity due to excess calories without serious comorbidity with body mass index (BMI) of 40.0 to 44.9 in adult  E66.01 278.01    Z68.41 V85.41       Plan:       Chronic constipation  -     lubiprostone (AMITIZA) 24 MCG Cap; Take 1 capsule (24 mcg total) by mouth 2 (two) times daily with meals.  Dispense: 60 capsule; Refill: 5    Gastroesophageal reflux disease, unspecified whether esophagitis present    Class 3 severe obesity due to excess calories without serious comorbidity with body mass index (BMI) of 40.0 to 44.9 in adult    - Continue current medication. Weight loss advised. Dietary and exercise counseling done.    Follow up if symptoms worsen or fail to improve.     Patient's Medications   New Prescriptions    No medications on file   Previous Medications    ATORVASTATIN (LIPITOR) 10 MG TABLET    Take 10 mg by mouth once daily.    FAMOTIDINE (PEPCID) 40 MG TABLET    Take 1 tablet (40 mg total) by mouth nightly as needed for Heartburn.    HYDROCHLOROTHIAZIDE (MICROZIDE) 12.5 MG CAPSULE    Take 12.5 mg by mouth once daily.    LOSARTAN (COZAAR) 100 MG TABLET    Take 100  mg by mouth once daily.    MELOXICAM (MOBIC) 15 MG TABLET    Take 1 tablet (15 mg total) by mouth once daily.    PANTOPRAZOLE (PROTONIX) 40 MG TABLET    Take 40 mg by mouth once daily.    TRAZODONE (DESYREL) 100 MG TABLET    Take 100 mg by mouth nightly.   Modified Medications    Modified Medication Previous Medication    LUBIPROSTONE (AMITIZA) 24 MCG CAP lubiprostone (AMITIZA) 24 MCG Cap       Take 1 capsule (24 mcg total) by mouth 2 (two) times daily with meals.    Take 1 capsule (24 mcg total) by mouth 2 (two) times daily with meals.   Discontinued Medications    No medications on file

## 2022-11-02 ENCOUNTER — OFFICE VISIT (OUTPATIENT)
Dept: PODIATRY | Facility: CLINIC | Age: 52
End: 2022-11-02
Payer: COMMERCIAL

## 2022-11-02 VITALS
BODY MASS INDEX: 40.29 KG/M2 | SYSTOLIC BLOOD PRESSURE: 136 MMHG | DIASTOLIC BLOOD PRESSURE: 85 MMHG | HEIGHT: 68 IN | WEIGHT: 265.81 LBS | HEART RATE: 72 BPM

## 2022-11-02 DIAGNOSIS — E66.01 MORBID OBESITY: ICD-10-CM

## 2022-11-02 DIAGNOSIS — M79.671 RIGHT FOOT PAIN: Primary | ICD-10-CM

## 2022-11-02 DIAGNOSIS — M24.573 EQUINUS CONTRACTURE OF ANKLE: ICD-10-CM

## 2022-11-02 DIAGNOSIS — M72.2 PLANTAR FASCIITIS: ICD-10-CM

## 2022-11-02 PROCEDURE — 1160F RVW MEDS BY RX/DR IN RCRD: CPT | Mod: CPTII,S$GLB,, | Performed by: PODIATRIST

## 2022-11-02 PROCEDURE — 99999 PR PBB SHADOW E&M-EST. PATIENT-LVL III: CPT | Mod: PBBFAC,,, | Performed by: PODIATRIST

## 2022-11-02 PROCEDURE — 20550 NJX 1 TENDON SHEATH/LIGAMENT: CPT | Mod: RT,S$GLB,, | Performed by: PODIATRIST

## 2022-11-02 PROCEDURE — 4010F ACE/ARB THERAPY RXD/TAKEN: CPT | Mod: CPTII,S$GLB,, | Performed by: PODIATRIST

## 2022-11-02 PROCEDURE — 3008F PR BODY MASS INDEX (BMI) DOCUMENTED: ICD-10-PCS | Mod: CPTII,S$GLB,, | Performed by: PODIATRIST

## 2022-11-02 PROCEDURE — 99999 PR PBB SHADOW E&M-EST. PATIENT-LVL III: ICD-10-PCS | Mod: PBBFAC,,, | Performed by: PODIATRIST

## 2022-11-02 PROCEDURE — 20550 PR INJECT TENDON SHEATH/LIGAMENT: ICD-10-PCS | Mod: RT,S$GLB,, | Performed by: PODIATRIST

## 2022-11-02 PROCEDURE — 3075F PR MOST RECENT SYSTOLIC BLOOD PRESS GE 130-139MM HG: ICD-10-PCS | Mod: CPTII,S$GLB,, | Performed by: PODIATRIST

## 2022-11-02 PROCEDURE — 3079F DIAST BP 80-89 MM HG: CPT | Mod: CPTII,S$GLB,, | Performed by: PODIATRIST

## 2022-11-02 PROCEDURE — 99203 PR OFFICE/OUTPT VISIT, NEW, LEVL III, 30-44 MIN: ICD-10-PCS | Mod: 25,S$GLB,, | Performed by: PODIATRIST

## 2022-11-02 PROCEDURE — 3008F BODY MASS INDEX DOCD: CPT | Mod: CPTII,S$GLB,, | Performed by: PODIATRIST

## 2022-11-02 PROCEDURE — 3079F PR MOST RECENT DIASTOLIC BLOOD PRESSURE 80-89 MM HG: ICD-10-PCS | Mod: CPTII,S$GLB,, | Performed by: PODIATRIST

## 2022-11-02 PROCEDURE — 4010F PR ACE/ARB THEARPY RXD/TAKEN: ICD-10-PCS | Mod: CPTII,S$GLB,, | Performed by: PODIATRIST

## 2022-11-02 PROCEDURE — 3075F SYST BP GE 130 - 139MM HG: CPT | Mod: CPTII,S$GLB,, | Performed by: PODIATRIST

## 2022-11-02 PROCEDURE — 1159F MED LIST DOCD IN RCRD: CPT | Mod: CPTII,S$GLB,, | Performed by: PODIATRIST

## 2022-11-02 PROCEDURE — 1160F PR REVIEW ALL MEDS BY PRESCRIBER/CLIN PHARMACIST DOCUMENTED: ICD-10-PCS | Mod: CPTII,S$GLB,, | Performed by: PODIATRIST

## 2022-11-02 PROCEDURE — 1159F PR MEDICATION LIST DOCUMENTED IN MEDICAL RECORD: ICD-10-PCS | Mod: CPTII,S$GLB,, | Performed by: PODIATRIST

## 2022-11-02 PROCEDURE — 99203 OFFICE O/P NEW LOW 30 MIN: CPT | Mod: 25,S$GLB,, | Performed by: PODIATRIST

## 2022-11-02 RX ORDER — TRIAMCINOLONE ACETONIDE 40 MG/ML
40 INJECTION, SUSPENSION INTRA-ARTICULAR; INTRAMUSCULAR
Status: COMPLETED | OUTPATIENT
Start: 2022-11-02 | End: 2022-11-02

## 2022-11-02 RX ADMIN — TRIAMCINOLONE ACETONIDE 40 MG: 40 INJECTION, SUSPENSION INTRA-ARTICULAR; INTRAMUSCULAR at 12:11

## 2022-11-02 NOTE — PROGRESS NOTES
Subjective:      Patient ID: Jerald Morales is a 51 y.o. female.    Chief Complaint: Foot Pain (Right)    51 y.o. female presenting with  right heel pain.  Patient points plantar aspect of the heel area for pain.  Describes pain as throbbing aching.  Complains of post static dyskinesia.  Patient denies acute injury.  Has been dealing with this pain for 3 years.  Pain has been progressively getting worse.  Patient tried meloxicam with minimal relief.  Patient is ambulating in tennis shoes. Known to Dr. Santos for her knee.     Review of Systems   Constitutional: Negative for chills, decreased appetite, fever and malaise/fatigue.   HENT:  Negative for congestion, ear discharge and sore throat.    Eyes:  Negative for discharge and pain.   Cardiovascular:  Negative for chest pain, claudication and leg swelling.   Respiratory:  Negative for cough and shortness of breath.    Skin:  Negative for color change, nail changes and rash.   Musculoskeletal:  Positive for stiffness. Negative for arthritis, joint pain, joint swelling and muscle weakness.        Right foot pain   Gastrointestinal:  Negative for bloating, abdominal pain, diarrhea, nausea and vomiting.   Genitourinary:  Negative for flank pain and hematuria.   Neurological:  Negative for headaches, numbness and weakness.   Psychiatric/Behavioral:  Negative for altered mental status.            Past Medical History:   Diagnosis Date    Hypertension        Past Surgical History:   Procedure Laterality Date    COLONOSCOPY N/A 7/8/2022    Procedure: COLONOSCOPY;  Surgeon: Stella Smith MD;  Location: [facility];  Service: Endoscopy;  Laterality: N/A;    ECTOPIC PREGNANCY SURGERY         Family History   Problem Relation Age of Onset    Hypertension Father     Diabetes Father     Hypertension Mother     Diabetes Mother     Hypertension Brother     Diabetes Brother        Social History     Socioeconomic History    Marital status:    Tobacco Use     "Smoking status: Never    Smokeless tobacco: Never   Substance and Sexual Activity    Alcohol use: Yes     Comment: social    Drug use: Not Currently       Current Outpatient Medications   Medication Sig Dispense Refill    atorvastatin (LIPITOR) 10 MG tablet Take 10 mg by mouth once daily.      famotidine (PEPCID) 40 MG tablet Take 1 tablet (40 mg total) by mouth nightly as needed for Heartburn. 30 tablet 2    hydroCHLOROthiazide (MICROZIDE) 12.5 mg capsule Take 12.5 mg by mouth once daily.      losartan (COZAAR) 100 MG tablet Take 100 mg by mouth once daily.      lubiprostone (AMITIZA) 24 MCG Cap Take 1 capsule (24 mcg total) by mouth 2 (two) times daily with meals. 60 capsule 5    meloxicam (MOBIC) 15 MG tablet Take 1 tablet (15 mg total) by mouth once daily. 30 tablet 5    pantoprazole (PROTONIX) 40 MG tablet Take 40 mg by mouth once daily.      traZODone (DESYREL) 100 MG tablet Take 100 mg by mouth nightly.       No current facility-administered medications for this visit.       Review of patient's allergies indicates:   Allergen Reactions    Tetracyclines Hives and Rash       Vitals:    11/02/22 1044   BP: 136/85   Pulse: 72   Weight: 120.6 kg (265 lb 12.8 oz)   Height: 5' 8" (1.727 m)   PainSc:   7   PainLoc: Foot       Objective:      Physical Exam  Constitutional:       General: She is not in acute distress.     Appearance: She is well-developed.   HENT:      Nose: Nose normal.   Eyes:      Conjunctiva/sclera: Conjunctivae normal.   Pulmonary:      Effort: Pulmonary effort is normal.   Chest:      Chest wall: No tenderness.   Abdominal:      Tenderness: There is no abdominal tenderness.   Musculoskeletal:      Cervical back: Normal range of motion.   Neurological:      Mental Status: She is alert and oriented to person, place, and time.   Psychiatric:         Behavior: Behavior normal.       Vascular: Distal DP/PT pulses palpable 2/4. CRT < 3 sec to tips of toes. No vericosities noted to LEs. Hair growth " present LE, warm to touch LE, No edema noted to LE.    Dermatologic: No open lesions, lacerations or wounds. Interdigital spaces clean, dry and intact. No erythema, rubor, calor noted LE    Musculoskeletal: MMT 5/5 in DF/PF/Inv/Ev resistance with no reproduction of pain in any direction. Passive range of motion of ankle and pedal joints is painless. No calf tenderness LE, Compartments soft/compressible. ROM of ankles with < 10 deg DF is noted b/l.  R foot: Tender to touch to plantar heel at plantar fascia insertion and over medial tubercle of the calcaneus. No calcaneus pain upon medial and lateral squeezing.     Neurological: Light touch, proprioception, and sharp/dull sensation are all intact. Protective threshold with the Marion-Wienstein monofilament is intact. Vibratory sensation intact.         Assessment:       Encounter Diagnoses   Name Primary?    Right foot pain Yes    Plantar fasciitis     Equinus contracture of ankle     Morbid obesity          Plan:       Jerald was seen today for foot pain.    Diagnoses and all orders for this visit:    Right foot pain  -     X-Ray Foot Complete Right; Future    Plantar fasciitis    Equinus contracture of ankle    Morbid obesity    I counseled the patient on her conditions, their implications and medical management.    51 y.o. female with right foot plantar fasciitis.    -right foot x-ray reviewed.  Plantar heel spur noted.  Mild OA changes of the midfoot.  -steroid injection. Pt tolerated well. Injection consisted of   Total of 2 cc Kenalog 40 with 0.5% Marcaine plain injected into the plantar medial  right heel. Skin was prepped with alcohol and ethyl chloride spray. Patient tolerated well with no complications and related relief following injection. Bandaid applied to injection site. Patient is to use combination of conservative treatment and return for follow up/reassessment at that time as needed. Timeout was performed with pt in the room.    -c/w stretching and  water bottle exercise. Instructions for both given to patient.  -The nature of the condition, options for management, as well as potential risks and complications were discussed in detail with patient. Patient was amenable to my recommendations and left my office fully informed and will follow up as instructed or sooner if necessary.    -Patient was advised of signs and symptoms of infection including redness, drainage, purulence, odor, streaking, fever, chills and I advised patient to seek medical attention (ER or urgent care) if these symptoms arise.   -Discussed reducing caloric intake, increase physical activity, weight loss, exercise, low salt diet, which may include blood sugar control to help with foot and ankle complications.  -f/u prn     Note dictated with voice recognition software, please excuse any grammatical errors.

## 2022-11-08 ENCOUNTER — PATIENT MESSAGE (OUTPATIENT)
Dept: PODIATRY | Facility: CLINIC | Age: 52
End: 2022-11-08
Payer: COMMERCIAL

## 2025-06-23 DIAGNOSIS — M25.50 CHRONIC PAIN OF MULTIPLE JOINTS: Primary | ICD-10-CM

## 2025-06-23 DIAGNOSIS — G89.29 CHRONIC PAIN OF MULTIPLE JOINTS: Primary | ICD-10-CM

## 2025-07-09 ENCOUNTER — CLINICAL SUPPORT (OUTPATIENT)
Dept: REHABILITATION | Facility: HOSPITAL | Age: 55
End: 2025-07-09
Payer: COMMERCIAL

## 2025-07-09 DIAGNOSIS — M25.551 RIGHT HIP PAIN: Primary | ICD-10-CM

## 2025-07-09 PROCEDURE — 97112 NEUROMUSCULAR REEDUCATION: CPT

## 2025-07-09 PROCEDURE — 97161 PT EVAL LOW COMPLEX 20 MIN: CPT

## 2025-07-09 NOTE — LETTER
July 14, 2025  Georgina Rowell NP  [address]  [address]  [place] [address]      To whom it may concern,     The attached plan of care is being sent to you for review and reference.    You may indicate your approval by signing the document electronically, or by faxing/mailing a signed copy of the final page of this document back to the attention of Iva Perry, PT:         Plan of Care 7/14/25   Effective from: 7/14/2025  Effective to: 9/23/2025    Active  Plan ID: [id number]           Participants as of 7/14/2025    Name Type Comments Contact Info    Georgina Rowell NP Referring Provider  [phone]      Last Plan Note     Author: Iva Perry, PT Status: Addendum Last edited: 7/9/2025  2:30 PM         Outpatient Rehab    Physical Therapy Evaluation    Patient Name: Jerald Morales  MRN: [id number]  YOB: 1970  Encounter Date: 7/9/2025    Therapy Diagnosis:   Encounter Diagnosis   Name Primary?    Right hip pain Yes     Physician: Georgina Rowell NP    Physician Orders: Eval and Treat  Medical Diagnosis: Chronic pain of multiple joints  Surgical Diagnosis: Not applicable for this Episode   Surgical Date: Not applicable for this Episode  Days Since Last Surgery: Not applicable for this Episode    Visit # / Visits Authorized:  1 / 1  Insurance Authorization Period: 6/23/2025 to 12/31/2025  Date of Evaluation: 7/9/2025  Plan of Care Certification: 7/9/2025 to 9/23/2025     Time In: 1445   Time Out: 1530  Total Time (in minutes): 45   Total Billable Time (in minutes):  45 minutes    Intake Outcome Measure for FOTO Survey    Therapist reviewed FOTO scores for Jerald Morales on 7/9/2025.   FOTO report - see Media section or FOTO account episode details.     Intake Score: Not applicable for this Episode%    Precautions:  Additional Precautions and Protocol Details: Standard    Subjective   History of Present Illness  Jerald is a 54 y.o. female who reports to  physical therapy with a chief concern of low back pain with radiating pain.     The patient reports a medical diagnosis of Chronic pain of multiple joints (M25.50, G89.29).            History of Present Condition/Illness: Jerald presents to physical therapy with reports of increased low back pain. She reports primary complaint in her upper right hip and occasionally down her leg.     Pain     Patient reports a current pain level of 5/10. Pain at best is reported as 3/10. Pain at worst is reported as 10/10.   Location: Spine, right hip, right leg  Clinical Progression (since onset): Stable  Pain Qualities: Aching, Burning, Radiating  Pain-Relieving Factors: Change in position, Lying down, Rest, Activity modification, Movement, Stretching, Sitting, Walking  Pain-Aggravating Factors: Bending, Computer work, Sitting, Standing, Sleeping, Rotation         Review of Systems  Patient reports: Cardiac History and Osteoarthritis  Patient denies: Bladder Incontinence, Bowel Incontinence, Cancer History, and Diabetes  Additional Red Flag Details: Prediabetic; denies seizures and asthma; hypertension managed with medications; right foot numbness, occasional left foot     Treatment History  Treatments  Previously Received Treatments: No  Currently Receiving Treatments: No    Living Arrangements  Living Situation  Living Arrangements: Children  Support Systems: Spouse/significant other, Children        Employment  Patient reports: Does the patient's condition impact their ability to work?  Employment Status: Employed full-time   [employer] worker      Past Medical History/Physical Systems Review:   Jerald Morales  has a past medical history of Hypertension.    Jerald Morales  has a past surgical history that includes Ectopic pregnancy surgery and Colonoscopy (N/A, 7/8/2022).    Jerald has a current medication list which includes the following prescription(s): atorvastatin, famotidine, hydrochlorothiazide,  losartan, lubiprostone, meloxicam, pantoprazole, and trazodone.    Review of patient's allergies indicates:   Allergen Reactions    Tetracyclines Hives and Rash        Objective      Lumbar Range of Motion   Active (deg) Passive (deg) Pain   Flexion 100       Extension 100   Yes   Right Lateral Flexion 100       Right Rotation 100   Yes   Left Lateral Flexion 100   Yes   Left Rotation 100         Numbers above represent percent of normal motion      Hip Range of Motion   Right Hip   Active (deg) Passive (deg) Pain   Flexion 115 120     Extension 10 15     ABduction         ADduction         External Rotation 90/90 45 50     External Rotation Prone         Internal Rotation 90/90 25 30     Internal Rotation Prone             Left Hip   Active (deg) Passive (deg) Pain   Flexion 115 120     Extension 10 15     ABduction         ADduction         External Rotation 90/90 45 50     External Rotation Prone         Internal Rotation 90/90 25 30     Internal Rotation Prone                            Hip Strength - Planes of Motion   Right Strength Right Pain Left Strength Left  Pain   Flexion (L2) 4 Yes 5     Extension           ABduction           ADduction           Internal Rotation 4+ Yes 4+     External Rotation 4+   4+         Knee Strength   Right Strength Right Pain Left Strength Left  Pain   Flexion (S2) 5   5     Prone Flexion           Extension (L3) 5   5            Ankle/Foot Strength - Planes of Motion   Right Strength Right Pain Left Strength Left  Pain   Dorsiflexion (L4) 5   5     Plantar Flexion (S1)           Inversion           Eversion           Great Toe Flexion           Great Toe Extension (L5)           Lesser Toes Flexion           Lesser Toes Extension                  Lumbar/Pelvic Girdle Special Tests       Lumbar Tests - SLR and Tension  Positive: Right Passive Straight Leg Raise  Negative: Left Passive Straight Leg Raise, Right Crossed Straight Leg Raise, and Left Crossed Straight Leg Raise                           Treatment:  Balance/Neuromuscular Re-Education  NMR 1: Education on prognosis, plan of care, home exercise program, exercise justification, involved anatomy, swelling/pain management  NMR 2: HEP: See patient instructions for HEP to be performed daily and progressed as tolerated    Time Entry(in minutes):  PT Evaluation (Low) Time Entry: 22  Neuromuscular Re-Education Time Entry: 23    Assessment & Plan   Assessment  Jerald presents with a condition of Low complexity.   Presentation of Symptoms: Stable       Functional Limitations: Activity tolerance, Completing work/school activities, Pain when reaching, Pain with ADLs/IADLs, Sitting tolerance, Disrupted sleep pattern, Range of motion, Performing household chores, Participating in leisure activities, Painful locomotion/ambulation  Impairments: Activity intolerance, Abnormal or restricted range of motion, Impaired physical strength, Lack of appropriate home exercise program, Pain with functional activity    Patient Goal for Therapy (PT): to decrease leg and back pain and return to PLOF  Prognosis: Good  Assessment Details: Jerald is a 54-year-old female with a medical diagnosis of Chronic pain of multiple joints M25.50, G89.29. Patient demonstrates deficits with range of motion, strength, and function that limit ability to participate in work and recreational activities. She would benefit from skilled PT services to normalize kinetic chain mobility, strength, and function to safely return to their prior level of activity.     Plan  From a physical therapy perspective, the patient would benefit from: Skilled Rehab Services    Planned therapy interventions include: Therapeutic exercise, Therapeutic activities, Neuromuscular re-education, Manual therapy, ADLs/IADLs, Other (Comment), and Gait training. Dry Needling (prn)  Planned modalities to include: Biofeedback, Electrical stimulation - attended, Electrical stimulation - passive/unattended,  Thermotherapy (hot pack), and Cryotherapy (cold pack).        Visit Frequency: 2 times Per Week for 10 Weeks.       This plan was discussed with Patient.   Discussion participants: Agreed Upon Plan of Care  Plan details: Frequency and duration of treatment to be adjusted as needed          The patient's spiritual, cultural, and educational needs were considered, and the patient is agreeable to the plan of care and goals.           Goals:   Active       Functional outcome       Patient will show a significant change in FOTO patient-reported outcome tool to demonstrate subjective improvement (Ongoing)       Start:  07/14/25    Expected End:  09/26/25            Patient stated goal: to decrease leg and back pain and return to PLOF  (Ongoing)       Start:  07/14/25    Expected End:  09/26/25            Patient will demonstrate independence in home program for support of progression (Ongoing)       Start:  07/14/25    Expected End:  09/01/25               Lifting & carrying objects       Patient will lift 10 lbs with proper body mechanics to demonstrate improved functional strength (Ongoing)       Start:  07/14/25    Expected End:  09/26/25               Pain       Patient will report a 2 point reduction in pain while performing PT exercises (Ongoing)       Start:  07/14/25    Expected End:  09/01/25               Range of Motion       Patient will achieve lumbar extension AROM with </= 3/10 pain to demonstrate improved functional mobility (Ongoing)       Start:  07/14/25    Expected End:  09/01/25                Iva Perry PT, DPT               Sincerely,      Iva Perry PT  [facility]                                                            Dear Iva Perry PT,    RE: Ms. Jerald Morales, MRN: [id number]    I certify that I have reviewed the attached plan of care and agree to the details within.        ___________________________  ___________________________  Provider Printed Name   Provider  Signed Name      ___________________________  Date and Time

## 2025-07-14 NOTE — PROGRESS NOTES
Outpatient Rehab    Physical Therapy Evaluation    Patient Name: Jerald Morales  MRN: [id number]  YOB: 1970  Encounter Date: 7/9/2025    Therapy Diagnosis:   Encounter Diagnosis   Name Primary?    Right hip pain Yes     Physician: Georgina Rowell NP    Physician Orders: Eval and Treat  Medical Diagnosis: Chronic pain of multiple joints  Surgical Diagnosis: Not applicable for this Episode   Surgical Date: Not applicable for this Episode  Days Since Last Surgery: Not applicable for this Episode    Visit # / Visits Authorized:  1 / 1  Insurance Authorization Period: 6/23/2025 to 12/31/2025  Date of Evaluation: 7/9/2025  Plan of Care Certification: 7/9/2025 to 9/23/2025     Time In: 1445   Time Out: 1530  Total Time (in minutes): 45   Total Billable Time (in minutes):  45 minutes    Intake Outcome Measure for FOTO Survey    Therapist reviewed FOTO scores for Jerald Morales on 7/9/2025.   FOTO report - see Media section or FOTO account episode details.     Intake Score: Not applicable for this Episode%    Precautions:  Additional Precautions and Protocol Details: Standard    Subjective   History of Present Illness  Jerald is a 54 y.o. female who reports to physical therapy with a chief concern of low back pain with radiating pain.     The patient reports a medical diagnosis of Chronic pain of multiple joints (M25.50, G89.29).            History of Present Condition/Illness: Jerald presents to physical therapy with reports of increased low back pain. She reports primary complaint in her upper right hip and occasionally down her leg.     Pain     Patient reports a current pain level of 5/10. Pain at best is reported as 3/10. Pain at worst is reported as 10/10.   Location: Spine, right hip, right leg  Clinical Progression (since onset): Stable  Pain Qualities: Aching, Burning, Radiating  Pain-Relieving Factors: Change in position, Lying down, Rest, Activity modification, Movement,  Stretching, Sitting, Walking  Pain-Aggravating Factors: Bending, Computer work, Sitting, Standing, Sleeping, Rotation         Review of Systems  Patient reports: Cardiac History and Osteoarthritis  Patient denies: Bladder Incontinence, Bowel Incontinence, Cancer History, and Diabetes  Additional Red Flag Details: Prediabetic; denies seizures and asthma; hypertension managed with medications; right foot numbness, occasional left foot     Treatment History  Treatments  Previously Received Treatments: No  Currently Receiving Treatments: No    Living Arrangements  Living Situation  Living Arrangements: Children  Support Systems: Spouse/significant other, Children        Employment  Patient reports: Does the patient's condition impact their ability to work?  Employment Status: Employed full-time   [employer]      Past Medical History/Physical Systems Review:   Jerald Morales  has a past medical history of Hypertension.    Jerald Morales  has a past surgical history that includes Ectopic pregnancy surgery and Colonoscopy (N/A, 7/8/2022).    Jerald has a current medication list which includes the following prescription(s): atorvastatin, famotidine, hydrochlorothiazide, losartan, lubiprostone, meloxicam, pantoprazole, and trazodone.    Review of patient's allergies indicates:   Allergen Reactions    Tetracyclines Hives and Rash        Objective      Lumbar Range of Motion   Active (deg) Passive (deg) Pain   Flexion 100       Extension 100   Yes   Right Lateral Flexion 100       Right Rotation 100   Yes   Left Lateral Flexion 100   Yes   Left Rotation 100         Numbers above represent percent of normal motion      Hip Range of Motion   Right Hip   Active (deg) Passive (deg) Pain   Flexion 115 120     Extension 10 15     ABduction         ADduction         External Rotation 90/90 45 50     External Rotation Prone         Internal Rotation 90/90 25 30     Internal Rotation Prone             Left  Hip   Active (deg) Passive (deg) Pain   Flexion 115 120     Extension 10 15     ABduction         ADduction         External Rotation 90/90 45 50     External Rotation Prone         Internal Rotation 90/90 25 30     Internal Rotation Prone                            Hip Strength - Planes of Motion   Right Strength Right Pain Left Strength Left  Pain   Flexion (L2) 4 Yes 5     Extension           ABduction           ADduction           Internal Rotation 4+ Yes 4+     External Rotation 4+   4+         Knee Strength   Right Strength Right Pain Left Strength Left  Pain   Flexion (S2) 5   5     Prone Flexion           Extension (L3) 5   5            Ankle/Foot Strength - Planes of Motion   Right Strength Right Pain Left Strength Left  Pain   Dorsiflexion (L4) 5   5     Plantar Flexion (S1)           Inversion           Eversion           Great Toe Flexion           Great Toe Extension (L5)           Lesser Toes Flexion           Lesser Toes Extension                  Lumbar/Pelvic Girdle Special Tests       Lumbar Tests - SLR and Tension  Positive: Right Passive Straight Leg Raise  Negative: Left Passive Straight Leg Raise, Right Crossed Straight Leg Raise, and Left Crossed Straight Leg Raise                          Treatment:  Balance/Neuromuscular Re-Education  NMR 1: Education on prognosis, plan of care, home exercise program, exercise justification, involved anatomy, swelling/pain management  NMR 2: HEP: See patient instructions for HEP to be performed daily and progressed as tolerated    Time Entry(in minutes):  PT Evaluation (Low) Time Entry: 22  Neuromuscular Re-Education Time Entry: 23    Assessment & Plan   Assessment  Jerald presents with a condition of Low complexity.   Presentation of Symptoms: Stable       Functional Limitations: Activity tolerance, Completing work/school activities, Pain when reaching, Pain with ADLs/IADLs, Sitting tolerance, Disrupted sleep pattern, Range of motion, Performing household  chores, Participating in leisure activities, Painful locomotion/ambulation  Impairments: Activity intolerance, Abnormal or restricted range of motion, Impaired physical strength, Lack of appropriate home exercise program, Pain with functional activity    Patient Goal for Therapy (PT): to decrease leg and back pain and return to PLOF  Prognosis: Good  Assessment Details: Jerald is a 54-year-old female with a medical diagnosis of Chronic pain of multiple joints M25.50, G89.29. Patient demonstrates deficits with range of motion, strength, and function that limit ability to participate in work and recreational activities. She would benefit from skilled PT services to normalize kinetic chain mobility, strength, and function to safely return to their prior level of activity.     Plan  From a physical therapy perspective, the patient would benefit from: Skilled Rehab Services    Planned therapy interventions include: Therapeutic exercise, Therapeutic activities, Neuromuscular re-education, Manual therapy, ADLs/IADLs, Other (Comment), and Gait training. Dry Needling (prn)  Planned modalities to include: Biofeedback, Electrical stimulation - attended, Electrical stimulation - passive/unattended, Thermotherapy (hot pack), and Cryotherapy (cold pack).        Visit Frequency: 2 times Per Week for 10 Weeks.       This plan was discussed with Patient.   Discussion participants: Agreed Upon Plan of Care  Plan details: Frequency and duration of treatment to be adjusted as needed          The patient's spiritual, cultural, and educational needs were considered, and the patient is agreeable to the plan of care and goals.           Goals:   Active       Functional outcome       Patient will show a significant change in FOTO patient-reported outcome tool to demonstrate subjective improvement (Ongoing)       Start:  07/14/25    Expected End:  09/26/25            Patient stated goal: to decrease leg and back pain and return to PLOF   (Ongoing)       Start:  07/14/25    Expected End:  09/26/25            Patient will demonstrate independence in home program for support of progression (Ongoing)       Start:  07/14/25    Expected End:  09/01/25               Lifting & carrying objects       Patient will lift 10 lbs with proper body mechanics to demonstrate improved functional strength (Ongoing)       Start:  07/14/25    Expected End:  09/26/25               Pain       Patient will report a 2 point reduction in pain while performing PT exercises (Ongoing)       Start:  07/14/25    Expected End:  09/01/25               Range of Motion       Patient will achieve lumbar extension AROM with </= 3/10 pain to demonstrate improved functional mobility (Ongoing)       Start:  07/14/25    Expected End:  09/01/25                Iva Perry, PT, DPT

## 2025-07-15 ENCOUNTER — DOCUMENTATION ONLY (OUTPATIENT)
Dept: REHABILITATION | Facility: HOSPITAL | Age: 55
End: 2025-07-15
Payer: COMMERCIAL

## 2025-07-15 NOTE — PROGRESS NOTES
Physical Therapy: No show/Cancellation of Visit  Date: 07/15/2025    Patient was a cancel to today's PT appointment. Reason for cancellation: transportation via [facility]. Patient's next scheduled appointment is 7/17/2025.    Cancel: 1   No show: 0     Therapist: Iva Perry, PT, DPT

## 2025-07-17 ENCOUNTER — CLINICAL SUPPORT (OUTPATIENT)
Dept: REHABILITATION | Facility: HOSPITAL | Age: 55
End: 2025-07-17
Payer: COMMERCIAL

## 2025-07-17 DIAGNOSIS — M25.551 RIGHT HIP PAIN: Primary | ICD-10-CM

## 2025-07-17 PROCEDURE — 97112 NEUROMUSCULAR REEDUCATION: CPT | Mod: CQ

## 2025-07-17 PROCEDURE — 97530 THERAPEUTIC ACTIVITIES: CPT | Mod: CQ

## 2025-07-17 NOTE — PROGRESS NOTES
Outpatient Rehab    Physical Therapy Visit    Patient Name: Jerald Morales  MRN: [id number]  YOB: 1970  Encounter Date: 7/17/2025    Therapy Diagnosis:   Encounter Diagnosis   Name Primary?    Right hip pain Yes     Physician: Georgina Rowell NP    Physician Orders: Eval and Treat  Medical Diagnosis: Pain in unspecified joint  Other chronic pain  Surgical Diagnosis: Not applicable for this Episode   Surgical Date: Not applicable for this Episode  Days Since Last Surgery: Not applicable for this Episode    Visit # / Visits Authorized:  1 / 10  Insurance Authorization Period: 7/8/2025 to 12/31/2025  Date of Evaluation: 7/9/2025  Plan of Care Certification: 7/14/2025 to 9/23/2025      PT/PTA: PTA   Number of PTA visits since last PT visit:1  Time In: 1430   Time Out: 1531  Total Time (in minutes): 61   Total Billable Time (in minutes): 61    FOTO:  Intake Score: 36%  Survey Score 2: Not applicable for this Episode%  Survey Score 3: Not applicable for this Episode%    Precautions:  Additional Precautions and Protocol Details: Standard      Subjective   Patient reports her radiating pain is better, it's occurring less often and is less intense.  Pain reported as 0/10. Right hip    Objective  Objective Measures updated at progress report unless specified.     Treatment:  Balance/Neuromuscular Re-Education  NMR 1: Education on prognosis, plan of care, home exercise program, exercise justification, involved anatomy, swelling/pain management  NMR 2: supine glut sets: 10 sec hold x 20 reps -- HL bridges with glut set: 2 x 10 reps  NMR 3: pelvic tilts: 5 sec hold, 2 x 10 reps  NMR 4: side lying clamshells: 5 sec hold, 2 x 10 reps each  NMR 5: lateral walks at half wall: 4 laps  NMR 6: paloff press, red tb: 20 reps each direction  NMR 7: standing hip abduction, extension: 3 x 10 reps each LE  Therapeutic Activity  TA 1: Nustep for CV and LE endurance: 10 minutes, Level 1  TA 2: shuttle DL press: 62.5  lbs, 3 x 10 reps  TA 3: Patient education: DOMS, load progression    Time Entry(in minutes):  Neuromuscular Re-Education Time Entry: 38  Therapeutic Activity Time Entry: 23    Assessment & Plan   Assessment: Overall good tolerance to therapeutic interventions noting adequate muscle response throughout. Notes pain during standing marches with hip flexion that radiates into anterior tib/shin. Responds well to other interventions noting adequate muscle response throughout.  Evaluation/Treatment Tolerance: Patient tolerated treatment well    The patient will continue to benefit from skilled outpatient physical therapy in order to address the deficits listed in the problem list on the initial evaluation, provide patient and family education, and maximize the patients level of independence in the home and community environments.     The patient's spiritual, cultural, and educational needs were considered, and the patient is agreeable to the plan of care and goals.           Plan: Will continue per POC towards treatment goals. PT/PTA met face to face to discuss patient's treatment plan and progress towards established goals. Patient will be seen by physical therapist every sixth visit and minimally once per month.    Goals:   Active       Functional outcome       Patient will show a significant change in FOTO patient-reported outcome tool to demonstrate subjective improvement (Progressing)       Start:  07/14/25    Expected End:  09/26/25            Patient stated goal: to decrease leg and back pain and return to PLOF  (Progressing)       Start:  07/14/25    Expected End:  09/26/25            Patient will demonstrate independence in home program for support of progression (Progressing)       Start:  07/14/25    Expected End:  09/01/25               Lifting & carrying objects       Patient will lift 10 lbs with proper body mechanics to demonstrate improved functional strength (Progressing)       Start:  07/14/25     Expected End:  09/26/25               Pain       Patient will report a 2 point reduction in pain while performing PT exercises (Progressing)       Start:  07/14/25    Expected End:  09/01/25               Range of Motion       Patient will achieve lumbar extension AROM with </= 3/10 pain to demonstrate improved functional mobility (Progressing)       Start:  07/14/25    Expected End:  09/01/25                Holley Renteria, PTA

## 2025-07-22 ENCOUNTER — CLINICAL SUPPORT (OUTPATIENT)
Dept: REHABILITATION | Facility: HOSPITAL | Age: 55
End: 2025-07-22
Payer: COMMERCIAL

## 2025-07-22 DIAGNOSIS — M25.551 RIGHT HIP PAIN: Primary | ICD-10-CM

## 2025-07-22 PROCEDURE — 97112 NEUROMUSCULAR REEDUCATION: CPT | Mod: CQ

## 2025-07-22 PROCEDURE — 97530 THERAPEUTIC ACTIVITIES: CPT | Mod: CQ

## 2025-07-22 NOTE — PROGRESS NOTES
Outpatient Rehab    Physical Therapy Visit    Patient Name: Jerald Morales  MRN: [id number]  YOB: 1970  Encounter Date: 7/22/2025    Therapy Diagnosis:   Encounter Diagnosis   Name Primary?    Right hip pain Yes     Physician: Georgina Rowell NP    Physician Orders: Eval and Treat  Medical Diagnosis: Pain in unspecified joint  Other chronic pain  Surgical Diagnosis: Not applicable for this Episode   Surgical Date: Not applicable for this Episode  Days Since Last Surgery: Not applicable for this Episode    Visit # / Visits Authorized:  2 / 10  Insurance Authorization Period: 7/8/2025 to 12/31/2025  Date of Evaluation: 7/9/2025  Plan of Care Certification: 7/14/2025 to 9/23/2025      PT/PTA: PTA   Number of PTA visits since last PT visit:2  Time In: 1500   Time Out: 1550  Total Time (in minutes): 50   Total Billable Time (in minutes): 50    FOTO:  Intake Score (%): 36  Survey Score 2 (%): Not applicable for this Episode  Survey Score 3 (%): Not applicable for this Episode    Precautions:  Additional Precautions and Protocol Details: Standard      Subjective   Patient reports she is having more pain than usual today.  Pain reported as 6/10. Right hip    Objective  Objective Measures updated at progress report unless specified.     Treatment:  Balance/Neuromuscular Re-Education  NMR 1: Education on prognosis, plan of care, home exercise program, exercise justification, involved anatomy, swelling/pain management  NMR 2: supine glut sets: 10 sec hold x 20 reps -- HL bridges with glut set: 2 x 10 reps  NMR 3: pelvic tilts: 5 sec hold, 2 x 10 reps  NMR 4: side lying clamshells: 5 sec hold, 2 x 10 reps each  NMR 5: lateral walks at half wall: 5 laps, yellow pb  NMR 7: standing hip abduction, extension: 3 x 10 reps each LE  Therapeutic Activity  TA 1: Nustep for CV and LE endurance: 10 minutes, Level 1  TA 2: shuttle DL press: 62.5 lbs, 3 x 10 reps  TA 3: Patient education: DOMS, load  progression    Time Entry(in minutes):  Neuromuscular Re-Education Time Entry: 38  Therapeutic Activity Time Entry: 12    Assessment & Plan   Assessment: Overall good tolerance to therapeutic interventions noting adequate muscle response throughout. Able to complete all interventions with noted hip fatigue. Session ended early per request to leave early due to work.  Evaluation/Treatment Tolerance: Patient tolerated treatment well    The patient will continue to benefit from skilled outpatient physical therapy in order to address the deficits listed in the problem list on the initial evaluation, provide patient and family education, and maximize the patients level of independence in the home and community environments.     The patient's spiritual, cultural, and educational needs were considered, and the patient is agreeable to the plan of care and goals.           Plan: Will continue per POC towards treatment goals. PT/PTA met face to face to discuss patient's treatment plan and progress towards established goals. Patient will be seen by physical therapist every sixth visit and minimally once per month.    Goals:   Active       Functional outcome       Patient will show a significant change in FOTO patient-reported outcome tool to demonstrate subjective improvement (Progressing)       Start:  07/14/25    Expected End:  09/26/25            Patient stated goal: to decrease leg and back pain and return to PLOF  (Progressing)       Start:  07/14/25    Expected End:  09/26/25            Patient will demonstrate independence in home program for support of progression (Progressing)       Start:  07/14/25    Expected End:  09/01/25               Lifting & carrying objects       Patient will lift 10 lbs with proper body mechanics to demonstrate improved functional strength (Progressing)       Start:  07/14/25    Expected End:  09/26/25               Pain       Patient will report a 2 point reduction in pain while performing  PT exercises (Progressing)       Start:  07/14/25    Expected End:  09/01/25               Range of Motion       Patient will achieve lumbar extension AROM with </= 3/10 pain to demonstrate improved functional mobility (Progressing)       Start:  07/14/25    Expected End:  09/01/25                Holley Renteria, PTA

## 2025-08-25 PROBLEM — M25.551 RIGHT HIP PAIN: Status: RESOLVED | Noted: 2025-07-09 | Resolved: 2025-08-25

## 2025-09-04 ENCOUNTER — OCCUPATIONAL HEALTH (OUTPATIENT)
Dept: URGENT CARE | Facility: CLINIC | Age: 55
End: 2025-09-04

## 2025-09-04 DIAGNOSIS — Z02.83 ENCOUNTER FOR DRUG SCREENING: Primary | ICD-10-CM
